# Patient Record
Sex: MALE | Race: WHITE
[De-identification: names, ages, dates, MRNs, and addresses within clinical notes are randomized per-mention and may not be internally consistent; named-entity substitution may affect disease eponyms.]

---

## 2017-03-24 ENCOUNTER — HOSPITAL ENCOUNTER (OUTPATIENT)
Age: 82
Discharge: HOME | End: 2017-03-24
Payer: MEDICARE

## 2017-03-24 DIAGNOSIS — J43.9: Primary | ICD-10-CM

## 2017-03-24 DIAGNOSIS — J98.4: ICD-10-CM

## 2017-08-31 ENCOUNTER — HOSPITAL ENCOUNTER (OUTPATIENT)
Dept: HOSPITAL 76 - SDS | Age: 82
Discharge: HOME | End: 2017-08-31
Attending: OPHTHALMOLOGY
Payer: MEDICARE

## 2017-08-31 VITALS — DIASTOLIC BLOOD PRESSURE: 73 MMHG | SYSTOLIC BLOOD PRESSURE: 162 MMHG

## 2017-08-31 DIAGNOSIS — J44.9: ICD-10-CM

## 2017-08-31 DIAGNOSIS — H25.12: Primary | ICD-10-CM

## 2017-08-31 PROCEDURE — 08RK3JZ REPLACEMENT OF LEFT LENS WITH SYNTHETIC SUBSTITUTE, PERCUTANEOUS APPROACH: ICD-10-PCS | Performed by: OPHTHALMOLOGY

## 2017-08-31 PROCEDURE — 66984 XCAPSL CTRC RMVL W/O ECP: CPT

## 2017-08-31 NOTE — OPERATIVE REPORT
DATE OF SURGERY:  08/31/2017 00:00:00

 

PREOPERATIVE DIAGNOSIS: Visually significant cataract, left eye. This is his 
first cataract surgery.

 

POSTOPERATIVE DIAGNOSIS: Visually significant cataract, left eye. This is his 
first cataract surgery.

 

NAME OF PROCEDURE: Phacoemulsification posterior chamber intraocular lens 
implant, left eye.

 

SURGEON: Sergio Bynum MD.

 

ANESTHESIA: Monitored anesthesia care.

 

COMPLICATIONS: None.

 

OPERATIVE INDICATIONS: The patient is an 85-year-old man with progressive 
vision loss in the left eye due to 2+ nuclear sclerotic cataract. Best 
corrected visual acuity was 20/50 with glare to 20/70 in the left eye. 
Indications for surgery were overall decrease in vision, difficulty seeing 
words on a computer screen, and difficulty driving at night because of head 
lights from other vehicles and/or streetlights. He was consented at length 
concerning the risks and benefits of cataract surgery, after which he expressed 
a desire to proceed with surgery.

 

OPERATIVE PROCEDURE: The patient was taken into OR #2 and placed under 
monitored anesthesia care. A surgical time-out was conducted confirming the 
correct patient, correct procedure, and correct surgical site. He was given 
topical anesthesia and then prepped and draped in the usual sterile fashion. 
The eye was entered at the 6- and 3 o'clock positions. Intracameral Shugarcaine 
was injected into the anterior chamber followed by Viscoat. A continuous tear 
curvilinear capsulorrhexis was performed. The nucleus was hydrodissected and 
phacoemulsified. The cortex was evacuated using automated infusion and 
aspiration (I/A). Provisc was injected in the capsular bag and a 25.0 diopter 
intraocular lens was inserted into the bag. Approximately 0.7 mL of a mixture 
of triamcinolone, moxifloxacin, and vancomycin was injected subconjunctivally 
in the superior quadrant for infection and inflammation prophylaxis. I/A was 
used to evacuate the viscoelastic materials. The eye was inflated to a 
physiologic pressure using balanced salt solution and found to be watertight. 
The patient was taken from the operating room in good condition and given 
postoperative instructions.

 

 

 

DD:08/31/2017 08:46:00  DT: 08/31/2017 09:32  JOB #: [id number]  EXT JOB #:[id number]

[facility] HospitalCHANDAN

## 2017-10-19 ENCOUNTER — HOSPITAL ENCOUNTER (OUTPATIENT)
Dept: HOSPITAL 76 - SDS | Age: 82
Discharge: HOME | End: 2017-10-19
Attending: OPHTHALMOLOGY
Payer: MEDICARE

## 2017-10-19 VITALS — DIASTOLIC BLOOD PRESSURE: 56 MMHG | SYSTOLIC BLOOD PRESSURE: 111 MMHG

## 2017-10-19 DIAGNOSIS — J44.9: ICD-10-CM

## 2017-10-19 DIAGNOSIS — H25.11: Primary | ICD-10-CM

## 2017-10-19 DIAGNOSIS — Z87.891: ICD-10-CM

## 2017-10-19 DIAGNOSIS — Z86.73: ICD-10-CM

## 2017-10-19 PROCEDURE — 66984 XCAPSL CTRC RMVL W/O ECP: CPT

## 2017-10-19 PROCEDURE — 08RJ3JZ REPLACEMENT OF RIGHT LENS WITH SYNTHETIC SUBSTITUTE, PERCUTANEOUS APPROACH: ICD-10-PCS | Performed by: OPHTHALMOLOGY

## 2017-10-19 NOTE — OPERATIVE REPORT
DATE OF SURGERY:  10/19/2017 00:00:00

 

PREOPERATIVE DIAGNOSIS: Visually significant cataract, right eye. Cataract surgery was performed on t
he left eye on 08/31/2017. 

 

POSTOPERATIVE DIAGNOSIS: Visually significant cataract, right eye. Cataract surgery was performed on 
the left eye on 08/31/2017. 

 

NAME OF PROCEDURE: Phacoemulsification posterior chamber intraocular lens implant, right eye.

 

SURGEON: Sergio Bynum MD.

 

ANESTHESIA: Monitored anesthesia care.

 

COMPLICATIONS: None.

 

OPERATIVE INDICATIONS: This is an 85-year-old man with progressive vision loss in the right eye due t
o 2+ nuclear sclerotic cataract. Best corrected visual acuity was 20/30 with glare to 20/40 in the ri
ght eye.

 

INDICATIONS FOR SURGERY: Overall decrease in vision, difficulty reading, difficulty driving in low li
ght or at night and difficulty driving at night because of headlights from other vehicles. He was con
sented at length concerning the risks and benefits of cataract surgery, after which he expressed a de
sire to proceed with surgery.

 

OPERATIVE PROCEDURE: The patient was taken to OR #2 and placed under monitored anesthesia care. A jenifer
gical time-out was conducted confirming the correct patient, correct procedure and correct surgical s
ite. He was given topical anesthesia and then prepped and draped in the usual sterile fashion. The ey
e was entered at the 12 and 9 o'clock positions. Intracameral Shugarcaine was injected into the anter
ior chamber followed by Viscoat. A continuous tear curvilinear capsulorrhexis was performed. Nucleus 
was hydrodissected and phacoemulsified. The cortex was evacuated using automated infusion aspiration.
 Provisc was injected into the capsular bag and a 24.5 diopter intraocular lens was inserted into the
 bag. Approximately 0.7 mL of a mixture of triamcinolone, moxifloxacin, and vancomycin was injected s
ubconjunctivally in the superior quadrant for infection and inflammation prophylaxis. I/A was used to
 evacuate the viscoelastic materials. Of note, the IOL kept wanting to decenter superiorly, and sever
al attempts to try to push it inferiorly were unsuccessful. So, I opted to sit the patient up immedia
tely after closing the eye to let gravity help me out with that, but there was enough optic in the ce
ntral axis to give him good vision, so I was not too concerned about it. The eye was inflated to phys
iologic pressure using balanced salt solution and found to be watertight. The patient was taken from 
the operating room in good condition and given postoperative instructions.

 

 

 

DD:10/19/2017 08:37:00  DT: 10/19/2017 09:15  JOB #: [id number]  EXT JOB #:[id number]

## 2017-11-13 ENCOUNTER — HOSPITAL ENCOUNTER (OUTPATIENT)
Dept: HOSPITAL 76 - DI | Age: 82
Discharge: HOME | End: 2017-11-13
Attending: PHYSICIAN ASSISTANT
Payer: MEDICARE

## 2017-11-13 DIAGNOSIS — J43.9: Primary | ICD-10-CM

## 2017-11-13 PROCEDURE — 71020: CPT

## 2017-11-13 NOTE — XRAY REPORT
TWO VIEW CHEST: 11/13/2017

 

CLINICAL INDICATION: Cough, COPD. 

 

FINDINGS:  Frontal and lateral views of the chest are compared to previous films of 03/24/2017. 

 

The cardiac silhouette is within normal limits. The lungs again demonstrate severe emphysema. Pleural
 calcifications are again noted. No new consolidation, effusion, or pneumothorax is present. 

 

IMPRESSION:  EMPHYSEMA. NO EVIDENCE OF ACUTE CARDIOPULMONARY DISEASE.

 

 

 

DD:11/13/2017 12:24:05  DT: 11/13/2017 12:39  JOB #: [id number]  EXT JOB #:[id number]

## 2018-01-15 ENCOUNTER — HOSPITAL ENCOUNTER (EMERGENCY)
Dept: HOSPITAL 76 - ED | Age: 83
Discharge: HOME | End: 2018-01-15
Payer: MEDICARE

## 2018-01-15 VITALS — DIASTOLIC BLOOD PRESSURE: 63 MMHG | SYSTOLIC BLOOD PRESSURE: 150 MMHG

## 2018-01-15 DIAGNOSIS — J11.1: Primary | ICD-10-CM

## 2018-01-15 DIAGNOSIS — J44.1: ICD-10-CM

## 2018-01-15 DIAGNOSIS — M19.90: ICD-10-CM

## 2018-01-15 DIAGNOSIS — N40.0: ICD-10-CM

## 2018-01-15 LAB
ANION GAP SERPL CALCULATED.4IONS-SCNC: 12 MMOL/L (ref 6–13)
BASOPHILS NFR BLD AUTO: 0.1 10^3/UL (ref 0–0.1)
BASOPHILS NFR BLD AUTO: 2.3 %
BUN SERPL-MCNC: 17 MG/DL (ref 6–20)
CALCIUM UR-MCNC: 8.9 MG/DL (ref 8.5–10.3)
CHLORIDE SERPL-SCNC: 95 MMOL/L (ref 101–111)
CO2 SERPL-SCNC: 26 MMOL/L (ref 21–32)
CREAT SERPLBLD-SCNC: 1 MG/DL (ref 0.6–1.2)
EOSINOPHIL # BLD AUTO: 0 10^3/UL (ref 0–0.7)
EOSINOPHIL NFR BLD AUTO: 0 %
ERYTHROCYTE [DISTWIDTH] IN BLOOD BY AUTOMATED COUNT: 13.6 % (ref 12–15)
GFRSERPLBLD MDRD-ARVRAT: 71 ML/MIN/{1.73_M2} (ref 89–?)
GLUCOSE SERPL-MCNC: 104 MG/DL (ref 70–100)
HGB UR QL STRIP: 13.6 G/DL (ref 14–18)
INFLUENZA A & B AG INTERPRET: (no result)
LYMPHOCYTES # SPEC AUTO: 0.6 10^3/UL (ref 1.5–3.5)
LYMPHOCYTES NFR BLD AUTO: 12.2 %
MCH RBC QN AUTO: 29.4 PG (ref 27–31)
MCHC RBC AUTO-ENTMCNC: 32.9 G/DL (ref 32–36)
MCV RBC AUTO: 89.3 FL (ref 80–94)
MONOCYTES # BLD AUTO: 1 10^3/UL (ref 0–1)
MONOCYTES NFR BLD AUTO: 19.9 %
NEUTROPHILS # BLD AUTO: 3.2 10^3/UL (ref 1.5–6.6)
NEUTROPHILS # SNV AUTO: 4.8 X10^3/UL (ref 4.8–10.8)
NEUTROPHILS NFR BLD AUTO: 65.6 %
PDW BLD AUTO: 8 FL (ref 7.4–11.4)
PLATELET # BLD: 136 10^3/UL (ref 130–450)
RBC MAR: 4.64 10^6/UL (ref 4.7–6.1)
SODIUM SERPLBLD-SCNC: 133 MMOL/L (ref 135–145)

## 2018-01-15 PROCEDURE — 80048 BASIC METABOLIC PNL TOTAL CA: CPT

## 2018-01-15 PROCEDURE — 36415 COLL VENOUS BLD VENIPUNCTURE: CPT

## 2018-01-15 PROCEDURE — 87276 INFLUENZA A AG IF: CPT

## 2018-01-15 PROCEDURE — 99284 EMERGENCY DEPT VISIT MOD MDM: CPT

## 2018-01-15 PROCEDURE — 93005 ELECTROCARDIOGRAM TRACING: CPT

## 2018-01-15 PROCEDURE — 87275 INFLUENZA B AG IF: CPT

## 2018-01-15 PROCEDURE — 85025 COMPLETE CBC W/AUTO DIFF WBC: CPT

## 2018-01-15 PROCEDURE — 71046 X-RAY EXAM CHEST 2 VIEWS: CPT

## 2018-01-15 PROCEDURE — 84484 ASSAY OF TROPONIN QUANT: CPT

## 2018-01-15 NOTE — ED PHYSICIAN DOCUMENTATION
History of Present Illness





- Stated complaint


Stated Complaint: CHEST PX/LETHARGIC





- Chief complaint


Chief Complaint: Cardiac





- Additonal information


Additional information: 





hx from pt


85m


cough cant clear mucous body aches fatigue and vomiting getting worse for a week


using mucnex pulmicort and albuterol s relief








Review of Systems


Constitutional: reports: Chills, Myalgias, Fatigue.  denies: Fever


Cardiac: denies: Chest pain / pressure


Respiratory: reports: Dyspnea, Cough


GI: reports: Nausea, Vomiting.  denies: Abdominal Pain


Musculoskeletal: denies: Extremity swelling


Neurologic: denies: Generalized weakness


Immunocompromised: denies: Immunocompromised





PD PAST MEDICAL HISTORY





- Past Medical History


Cardiovascular: None


Respiratory: Asthma, COPD, Other


Neuro: None


Endocrine/Autoimmune: None


GI: GI bleed, C.difficile


: Benign prostate hypertrophy


HEENT: Chronic vision loss


Psych: None


Musculoskeletal: Osteoarthritis, Other


Derm: None





- Past Surgical History


Past Surgical History: Yes


General: Colonoscopy, Other


Ortho: Shoulder arthroplasty, Other


HEENT: Tonsil/Adenoidectomy





- Present Medications


Home Medications: 


 Ambulatory Orders











 Medication  Instructions  Recorded  Confirmed


 


Ipratropium/Albuterol Inhaler 1 puffs INH QID 04/26/14 01/15/18





[Combivent Inhaler]   


 


guaiFENesin [Mucinex] 600 mg PO DAILY 08/04/14 01/15/18


 


Albuterol [Ventolin Hfa] 2 puffs INH Q4H 08/30/17 01/15/18


 


Oseltamivir [Tamiflu] 75 mg PO BID #9 capsule 01/15/18 


 


predniSONE [Deltasone] 60 mg PO DAILY 5 Days  tablet 01/15/18 














- Allergies


Allergies/Adverse Reactions: 


 Allergies











Allergy/AdvReac Type Severity Reaction Status Date / Time


 


No Known Drug Allergies Allergy   Verified 01/15/18 14:38














- Social History


Does the pt smoke?: No


Smoking Status: Never smoker


Does the pt drink ETOH?: No


Does the pt have substance abuse?: No





- Immunizations


Immunizations are current?: Yes





PD ED PE NORMAL





- Vitals


Vital signs reviewed: Yes





- General


General: Alert and oriented X 3





- HEENT


HEENT: PERRL





- Neck


Neck: Supple, no meningeal sign





- Cardiac


Cardiac: RRR





- Respiratory


Respiratory: Other (coarse bilaterally)





- Abdomen


Abdomen: Soft, Non tender





- Extremities


Extremities: No deformity, No edema, No calf tenderness / cord





- Neuro


Neuro: Alert and oriented X 3





Results





- Vitals


Vitals: 


 Vital Signs - 24 hr











  01/15/18 01/15/18





  14:34 15:00


 


Temperature 37.1 C 


 


Heart Rate 62 61


 


Respiratory 16 20





Rate  


 


Blood Pressure 178/71 H 146/58 H


 


O2 Saturation 100 99








 Oxygen











O2 Source                      Room air

















- EKG (time done)


  ** 1407


Rate: Rate (enter#) (61)


Rhythm: NSR


Axis: Normal


Ischemia: Normal ST segments





- Labs


Labs: 


 Laboratory Tests











  01/15/18 01/15/18 01/15/18





  14:40 15:16 15:16


 


WBC   4.8 


 


RBC   4.64 L 


 


Hgb   13.6 L 


 


Hct   41.4 L 


 


MCV   89.3 


 


MCH   29.4 


 


MCHC   32.9 


 


RDW   13.6 


 


Plt Count   136 


 


MPV   8.0 


 


Neut #   3.2 


 


Lymph #   0.6 L 


 


Mono #   1.0 


 


Eos #   0.0 


 


Baso #   0.1 


 


Absolute Nucleated RBC   0.00 


 


Nucleated RBC %   0.0 


 


Sodium    133 L


 


Potassium    4.0


 


Chloride    95 L


 


Carbon Dioxide    26


 


Anion Gap    12.0


 


BUN    17


 


Creatinine    1.0


 


Estimated GFR (MDRD)    71 L


 


Glucose    104 H


 


Calcium    8.9


 


Troponin I   


 


Influenza A (Rapid)  Negative  


 


Influenza B (Rapid)  Negative  


 


Influenza Types A,B Ag  -  














  01/15/18





  15:16


 


WBC 


 


RBC 


 


Hgb 


 


Hct 


 


MCV 


 


MCH 


 


MCHC 


 


RDW 


 


Plt Count 


 


MPV 


 


Neut # 


 


Lymph # 


 


Mono # 


 


Eos # 


 


Baso # 


 


Absolute Nucleated RBC 


 


Nucleated RBC % 


 


Sodium 


 


Potassium 


 


Chloride 


 


Carbon Dioxide 


 


Anion Gap 


 


BUN 


 


Creatinine 


 


Estimated GFR (MDRD) 


 


Glucose 


 


Calcium 


 


Troponin I  < 0.04


 


Influenza A (Rapid) 


 


Influenza B (Rapid) 


 


Influenza Types A,B Ag 














- Rads (name of study)


  ** CXR


Radiology: See rad report ([facility] - has some scarring or atelectasis RML but per 

rad no acute process)





PD MEDICAL DECISION MAKING





- ED course


ED course: 





no pna


flu swab neg but not sure of accuracy as hospital is apparently out of the 

viral swabs so this kits came with a plain cotton tip applicator instead of the 

usual viral swab and it was too stiff to reach deep into the nares - certainly 

his sx are c/w influenza and there is a major outbreak of both flu A and B in 

the community at this time and he has underlying lung dz so will tx clinically


with nl O2 sats and other VS will try outpt tx





spoke to pt and family at length, they are OK going home and understand reasons 

to return





Departure





- Departure


Disposition: 01 Home, Self Care


Clinical Impression: 


 Influenza, COPD exacerbation





Condition: Good


Instructions:  ED COPD Flare, ED Flu


Follow-Up: 


Jacky Haynes MD [Primary Care Provider] - 


Prescriptions: 


Oseltamivir [Tamiflu] 75 mg PO BID #9 capsule


predniSONE [Deltasone] 60 mg PO DAILY 5 Days  tablet

## 2018-01-15 NOTE — XRAY REPORT
EXAM:

CHEST RADIOGRAPHY

 

EXAM DATE: 1/15/2018 03:31 PM.

 

CLINICAL HISTORY: Cough, chest pain, and shortness of air.

 

COMPARISON: Chest 11/13/2017.

 

TECHNIQUE: 2 views.

 

FINDINGS: 

Hyperexpanded hyperlucent lungs again noted. Calcified pleural plaques on the left side again noted. 
Mild right base probable scarring, unchanged. Normal heart size. No acute cardiopulmonary disease see
n.

 

 

IMPRESSION: Emphysema. Left calcified pleural plaques. No acute findings seen.

 

[facility]

Referring Provider Line: [phone]

 

SITE ID: [id number]

## 2018-01-15 NOTE — XRAY PRELIMINARY REPORT
Accession: [id number]

Exam: XR CHEST 2 VIEW X-RAY

 

IMPRESSION: Emphysema. Left calcified pleural plaques. No acute findings seen.

 

[facility]

 

SITE ID: [id number]

## 2018-01-21 ENCOUNTER — HOSPITAL ENCOUNTER (EMERGENCY)
Dept: HOSPITAL 76 - ED | Age: 83
Discharge: HOME | End: 2018-01-21
Payer: MEDICARE

## 2018-01-21 VITALS — DIASTOLIC BLOOD PRESSURE: 72 MMHG | SYSTOLIC BLOOD PRESSURE: 164 MMHG

## 2018-01-21 DIAGNOSIS — Z79.52: ICD-10-CM

## 2018-01-21 DIAGNOSIS — Z79.51: ICD-10-CM

## 2018-01-21 DIAGNOSIS — J18.9: Primary | ICD-10-CM

## 2018-01-21 DIAGNOSIS — J44.9: ICD-10-CM

## 2018-01-21 LAB
ALBUMIN DIAFP-MCNC: 3.7 G/DL (ref 3.2–5.5)
ALBUMIN/GLOB SERPL: 1.1 {RATIO} (ref 1–2.2)
ALP SERPL-CCNC: 51 IU/L (ref 42–121)
ALT SERPL W P-5'-P-CCNC: 15 IU/L (ref 10–60)
ANION GAP SERPL CALCULATED.4IONS-SCNC: 12 MMOL/L (ref 6–13)
AST SERPL W P-5'-P-CCNC: 15 IU/L (ref 10–42)
BASOPHILS NFR BLD AUTO: 0 10^3/UL (ref 0–0.1)
BASOPHILS NFR BLD AUTO: 0.1 %
BILIRUB BLD-MCNC: 1.4 MG/DL (ref 0.2–1)
BUN SERPL-MCNC: 19 MG/DL (ref 6–20)
CALCIUM UR-MCNC: 9.4 MG/DL (ref 8.5–10.3)
CHLORIDE SERPL-SCNC: 93 MMOL/L (ref 101–111)
CO2 SERPL-SCNC: 26 MMOL/L (ref 21–32)
CREAT SERPLBLD-SCNC: 1 MG/DL (ref 0.6–1.2)
EOSINOPHIL # BLD AUTO: 0 10^3/UL (ref 0–0.7)
EOSINOPHIL NFR BLD AUTO: 0 %
ERYTHROCYTE [DISTWIDTH] IN BLOOD BY AUTOMATED COUNT: 13.7 % (ref 12–15)
GFRSERPLBLD MDRD-ARVRAT: 71 ML/MIN/{1.73_M2} (ref 89–?)
GLOBULIN SER-MCNC: 3.4 G/DL (ref 2.1–4.2)
GLUCOSE SERPL-MCNC: 131 MG/DL (ref 70–100)
HGB UR QL STRIP: 14.2 G/DL (ref 14–18)
INFLUENZA A & B AG INTERPRET: (no result)
LIPASE SERPL-CCNC: 17 U/L (ref 22–51)
LYMPHOCYTES # SPEC AUTO: 0.8 10^3/UL (ref 1.5–3.5)
LYMPHOCYTES NFR BLD AUTO: 6.8 %
MCH RBC QN AUTO: 29.8 PG (ref 27–31)
MCHC RBC AUTO-ENTMCNC: 33.5 G/DL (ref 32–36)
MCV RBC AUTO: 89 FL (ref 80–94)
MONOCYTES # BLD AUTO: 1.3 10^3/UL (ref 0–1)
MONOCYTES NFR BLD AUTO: 11 %
NEUTROPHILS # BLD AUTO: 9.7 10^3/UL (ref 1.5–6.6)
NEUTROPHILS # SNV AUTO: 11.8 X10^3/UL (ref 4.8–10.8)
NEUTROPHILS NFR BLD AUTO: 82.1 %
PDW BLD AUTO: 8.7 FL (ref 7.4–11.4)
PLATELET # BLD: 141 10^3/UL (ref 130–450)
PROT SPEC-MCNC: 7.1 G/DL (ref 6.7–8.2)
RBC MAR: 4.76 10^6/UL (ref 4.7–6.1)
SODIUM SERPLBLD-SCNC: 131 MMOL/L (ref 135–145)

## 2018-01-21 PROCEDURE — 83690 ASSAY OF LIPASE: CPT

## 2018-01-21 PROCEDURE — 87276 INFLUENZA A AG IF: CPT

## 2018-01-21 PROCEDURE — 71045 X-RAY EXAM CHEST 1 VIEW: CPT

## 2018-01-21 PROCEDURE — 83880 ASSAY OF NATRIURETIC PEPTIDE: CPT

## 2018-01-21 PROCEDURE — 85025 COMPLETE CBC W/AUTO DIFF WBC: CPT

## 2018-01-21 PROCEDURE — 99283 EMERGENCY DEPT VISIT LOW MDM: CPT

## 2018-01-21 PROCEDURE — 93005 ELECTROCARDIOGRAM TRACING: CPT

## 2018-01-21 PROCEDURE — 94640 AIRWAY INHALATION TREATMENT: CPT

## 2018-01-21 PROCEDURE — 36415 COLL VENOUS BLD VENIPUNCTURE: CPT

## 2018-01-21 PROCEDURE — 80053 COMPREHEN METABOLIC PANEL: CPT

## 2018-01-21 PROCEDURE — 99284 EMERGENCY DEPT VISIT MOD MDM: CPT

## 2018-01-21 PROCEDURE — 87275 INFLUENZA B AG IF: CPT

## 2018-01-21 PROCEDURE — 84484 ASSAY OF TROPONIN QUANT: CPT

## 2018-01-21 NOTE — XRAY REPORT
EXAM: 

CHEST RADIOGRAPHY

 

EXAM DATE: 1/21/2018 01:02 PM.

 

CLINICAL HISTORY: Productive cough.

 

COMPARISON: 2 view chest 01/15/2018.

 

TECHNIQUE: 1 view.

 

FINDINGS:

 

There are large lung volumes and flat hemidiaphragms.

 

Calcified left pleural plaques are noted.

 

Stable basilar scarring.

 

Mild left mid and lower lung patchy opacity is more conspicuous on today's exam.

 

No pneumothorax or obvious pleural effusion. Mediastinum stable.

 

IMPRESSION: Mild left mid and lower lung patchy opacity is more conspicuous today. Correlate clinical
ly for pneumonia.

 

[facility]

Referring Provider Line: [phone]

 

SITE ID: [id number]

## 2018-01-21 NOTE — ED PHYSICIAN DOCUMENTATION
PD HPI URI





- Stated complaint


Stated Complaint: MALAISE





- Chief complaint


Chief Complaint: General





- History obtained from


History obtained from: Patient





- History of Present Illness


Timing - onset: How many weeks ago (1)


Timing duration: Weeks (1)


Timing details: Abrupt onset (he started with cough and flu symptoms a week ago 

and has persisted. Seen in ED and Rx Albuterol, and steroids. Still feeling 

"like hell" and worse cough.), Still present


Associated symptoms: Fever, Dry cough.  No: Sore throat, Chest pain, NVD


Contributing factors: COPD / asthma.  No: Sick contact, Travel


Similar symptoms before: Has not had sx before


Recently seen: Emergency Dept





Review of Systems


Constitutional: reports: Fever, Chills, Myalgias


Nose: denies: Rhinorrhea / runny nose, Congestion


Throat: denies: Sore throat


Cardiac: denies: Chest pain / pressure, Palpitations


Respiratory: reports: Cough, Wheezing.  denies: Dyspnea


GI: denies: Nausea, Vomiting, Diarrhea


Skin: denies: Rash, Lesions


Neurologic: reports: Generalized weakness.  denies: Focal weakness, Numbness, 

Near syncope





PD PAST MEDICAL HISTORY





- Past Medical History


Cardiovascular: None


Respiratory: Asthma, COPD, Other


Neuro: None


Endocrine/Autoimmune: None


GI: GI bleed, C.difficile


: Benign prostate hypertrophy


HEENT: Chronic vision loss


Psych: None


Musculoskeletal: Osteoarthritis, Other


Derm: None





- Past Surgical History


Past Surgical History: Yes


General: Colonoscopy, Other


Ortho: Shoulder arthroplasty, Other


HEENT: Tonsil/Adenoidectomy





- Present Medications


Home Medications: 


 Ambulatory Orders











 Medication  Instructions  Recorded  Confirmed


 


Ipratropium/Albuterol Inhaler 1 puffs INH QID 04/26/14 01/15/18





[Combivent Inhaler]   


 


guaiFENesin [Mucinex] 600 mg PO DAILY 08/04/14 01/15/18


 


Albuterol [Ventolin Hfa] 2 puffs INH Q4H 08/30/17 01/15/18


 


Oseltamivir [Tamiflu] 75 mg PO BID #9 capsule 01/15/18 


 


predniSONE [Deltasone] 60 mg PO DAILY 5 Days  tablet 01/15/18 


 


Dexamethasone [Decadron] 4 mg PO DAILY #5 tablet 01/21/18 


 


Doxycycline Monohydrate 100 mg PO BID #14 tablet 01/21/18 


 


Loperamide [Imodium] 2 mg PO BID #14 capsule 01/21/18 


 


Saccharomyces Boulardii [Florastor] 500 mg PO BID #28 capsule 01/21/18 














- Allergies


Allergies/Adverse Reactions: 


 Allergies











Allergy/AdvReac Type Severity Reaction Status Date / Time


 


No Known Drug Allergies Allergy   Verified 01/15/18 14:38














- Social History


Does the pt smoke?: No


Smoking Status: Never smoker


Does the pt drink ETOH?: No


Does the pt have substance abuse?: No





- Immunizations


Immunizations are current?: Yes





PD ED PE NORMAL





- Vitals


Vital signs reviewed: Yes





- General


General: Alert and oriented X 3, No acute distress, Well developed/nourished





- HEENT


HEENT: Pharynx benign





- Neck


Neck: Supple, no meningeal sign, No adenopathy





- Cardiac


Cardiac: RRR, No murmur





- Respiratory


Respiratory: No: Clear bilaterally (some mild wheezing; no coarse sounds. )





- Abdomen


Abdomen: Soft, Non tender





- Back


Back: No CVA TTP





- Derm


Derm: Normal color, Warm and dry





- Extremities


Extremities: No tenderness to palpate, Normal ROM s pain, No edema, No calf 

tenderness / cord





- Neuro


Neuro: Alert and oriented X 3, No motor deficit, Normal speech





- Psych


Psych: Normal mood, Normal affect





Results





- Vitals


Vitals: 


 Vital Signs - 24 hr











  01/21/18 01/21/18 01/21/18





  12:23 13:10 14:11


 


Temperature 36.4 C L  


 


Heart Rate 110 H 68 


 


Respiratory 16 8 L 





Rate   


 


Blood Pressure 136/55 H  


 


O2 Saturation   99














  01/21/18





  16:35


 


Temperature 


 


Heart Rate 75


 


Respiratory 22





Rate 


 


Blood Pressure 164/72 H


 


O2 Saturation 100








 Oxygen











O2 Source                      Room air

















- Labs


Labs: 


 Laboratory Tests











  01/21/18 01/21/18 01/21/18





  12:30 12:45 12:45


 


WBC   11.8 H 


 


RBC   4.76 


 


Hgb   14.2 


 


Hct   42.3 


 


MCV   89.0 


 


MCH   29.8 


 


MCHC   33.5 


 


RDW   13.7 


 


Plt Count   141 


 


MPV   8.7 


 


Neut #   9.7 H 


 


Lymph #   0.8 L 


 


Mono #   1.3 H 


 


Eos #   0.0 


 


Baso #   0.0 


 


Absolute Nucleated RBC   0.00 


 


Nucleated RBC %   0.0 


 


Sodium    131 L


 


Potassium    3.9


 


Chloride    93 L


 


Carbon Dioxide    26


 


Anion Gap    12.0


 


BUN    19


 


Creatinine    1.0


 


Estimated GFR (MDRD)    71 L


 


Glucose    131 H


 


Calcium    9.4


 


Total Bilirubin    1.4 H


 


AST    15


 


ALT    15


 


Alkaline Phosphatase    51


 


Troponin I   


 


B-Natriuretic Peptide   


 


Total Protein    7.1


 


Albumin    3.7


 


Globulin    3.4


 


Albumin/Globulin Ratio    1.1


 


Lipase    17 L


 


Influenza A (Rapid)  Negative  


 


Influenza B (Rapid)  Negative  


 


Influenza Types A,B Ag  -  














  01/21/18 01/21/18





  12:45 12:45


 


WBC  


 


RBC  


 


Hgb  


 


Hct  


 


MCV  


 


MCH  


 


MCHC  


 


RDW  


 


Plt Count  


 


MPV  


 


Neut #  


 


Lymph #  


 


Mono #  


 


Eos #  


 


Baso #  


 


Absolute Nucleated RBC  


 


Nucleated RBC %  


 


Sodium  


 


Potassium  


 


Chloride  


 


Carbon Dioxide  


 


Anion Gap  


 


BUN  


 


Creatinine  


 


Estimated GFR (MDRD)  


 


Glucose  


 


Calcium  


 


Total Bilirubin  


 


AST  


 


ALT  


 


Alkaline Phosphatase  


 


Troponin I  < 0.04 


 


B-Natriuretic Peptide   40


 


Total Protein  


 


Albumin  


 


Globulin  


 


Albumin/Globulin Ratio  


 


Lipase  


 


Influenza A (Rapid)  


 


Influenza B (Rapid)  


 


Influenza Types A,B Ag  














- Rads (name of study)


  ** chest


Radiology: Prelim report reviewed (left lung focal infiltrates c/w pneumonia), 

EMP read contemporaneously





PD MEDICAL DECISION MAKING





- ED course


Complexity details: reviewed results (focal infiltrates (small) left lung 

compared to prior. Dx pneumonia. prior scarring left lung similar to prior. ), 

considered differential, d/w patient





Departure





- Departure


Disposition: 01 Home, Self Care


Clinical Impression: 


 Flu-like symptoms





Pneumonia


Qualifiers:


 Pneumonia type: due to unspecified organism Laterality: left Lung location: 

unspecified part of lung Qualified Code(s): J18.9 - Pneumonia, unspecified 

organism





Condition: Stable


Record reviewed to determine appropriate education?: Yes


Instructions:  ED Pneumonia Adult


Follow-Up: 


Jacky Haynes MD [Primary Care Provider] - 


Prescriptions: 


Dexamethasone [Decadron] 4 mg PO DAILY #5 tablet


Doxycycline Monohydrate 100 mg PO BID #14 tablet


Loperamide [Imodium] 2 mg PO BID #14 capsule


Saccharomyces Boulardii [Florastor] 500 mg PO BID #28 capsule


Comments: 


Your x-ray does appear that you are developing some pneumonia.  Otherwise your 

blood count is still good.  This sounds generally like a flu type illness with 

now some pneumonia as well.  We will add doxycycline twice daily for a week.  

Along with that to use Imodium twice daily and a probiotic Florastor twice 

daily as well.  This would to minimize the diarrhea/GI side effects of the 

antibiotic.  Continue the prednisone another 5 days.  Continue your albuterol 

inhaler 2 puffs 4 times a day.  Follow-up with your primary care in the next 2-

3 days.  Return sooner if worse.


Discharge Date/Time: 01/21/18 17:00

## 2018-02-09 ENCOUNTER — HOSPITAL ENCOUNTER (OUTPATIENT)
Dept: HOSPITAL 76 - DI | Age: 83
Discharge: HOME | End: 2018-02-09
Attending: INTERNAL MEDICINE
Payer: MEDICARE

## 2018-02-09 DIAGNOSIS — J18.9: Primary | ICD-10-CM

## 2018-02-09 DIAGNOSIS — J43.9: ICD-10-CM

## 2018-02-09 PROCEDURE — 71046 X-RAY EXAM CHEST 2 VIEWS: CPT

## 2018-02-09 NOTE — XRAY REPORT
TWO VIEW CHEST:  02/09/2018

 

CLINICAL INDICATION:  Followup pneumonia, COPD.

 

COMPARISON:  01/21/2018.

 

FINDINGS:  Frontal and lateral views of the chest again demonstrate extensive emphysema. Left 

greater than right calcified pleural plaquing is again noted.  There is new parenchymal 

consolidation at the left base.  No effusion or pneumothorax is present.

 

IMPRESSION:  NEW LEFT BASILAR INFILTRATE.  STABLE EMPHYSEMA AND CALCIFIED PLEURAL PLAQUING.

 

 

DD: 02/09/2018 15:38

TD: 02/09/2018 17:18

Job #: [id number]

## 2018-03-15 ENCOUNTER — HOSPITAL ENCOUNTER (OUTPATIENT)
Dept: HOSPITAL 76 - DI | Age: 83
Discharge: HOME | End: 2018-03-15
Attending: INTERNAL MEDICINE
Payer: MEDICARE

## 2018-03-15 DIAGNOSIS — J92.9: Primary | ICD-10-CM

## 2018-03-15 PROCEDURE — 71046 X-RAY EXAM CHEST 2 VIEWS: CPT

## 2018-03-16 NOTE — XRAY REPORT
TWO VIEW CHEST:  03/15/2018.

 

COMPARISON:  Two view chest 02/09/2018 and chest CT 10/07/2014.

 

INDICATION Followup for pneumonia.

 

TECHNIQUE:  Two views.

 

FINDINGS:  There are calcified pleural plaques.  There are large lung volumes 
with flattening 

of the diaphragms.

 

No pneumothorax or pleural effusion.  Mediastinum otherwise unremarkable.

 

IMPRESSION:  STABLE APPEARING PLEURAL PLAQUES.  NO EVIDENCE OF PNEUMONIA.  
PREVIOUSLY DESCRIBED

LEFT BASILAR CONSOLIDATION IS FAVORED TO REPRESENT AN ADDITIONAL PLEURAL PLAQUE.

 

 

DD: 03/16/2018 09:14

TD: 03/16/2018 09:33

Job #: [id number]

MTDD

## 2018-08-07 ENCOUNTER — HOSPITAL ENCOUNTER (EMERGENCY)
Dept: HOSPITAL 76 - ED | Age: 83
Discharge: HOME | End: 2018-08-07
Payer: MEDICARE

## 2018-08-07 VITALS — SYSTOLIC BLOOD PRESSURE: 172 MMHG | DIASTOLIC BLOOD PRESSURE: 62 MMHG

## 2018-08-07 DIAGNOSIS — N39.0: Primary | ICD-10-CM

## 2018-08-07 LAB
ALBUMIN DIAFP-MCNC: 3.5 G/DL (ref 3.2–5.5)
ALBUMIN/GLOB SERPL: 1 {RATIO} (ref 1–2.2)
ALP SERPL-CCNC: 50 IU/L (ref 42–121)
ALT SERPL W P-5'-P-CCNC: 12 IU/L (ref 10–60)
ANION GAP SERPL CALCULATED.4IONS-SCNC: 8 MMOL/L (ref 6–13)
AST SERPL W P-5'-P-CCNC: 18 IU/L (ref 10–42)
BASOPHILS NFR BLD AUTO: 0 10^3/UL (ref 0–0.1)
BASOPHILS NFR BLD AUTO: 0.4 %
BILIRUB BLD-MCNC: 0.9 MG/DL (ref 0.2–1)
BUN SERPL-MCNC: 19 MG/DL (ref 6–20)
CALCIUM UR-MCNC: 9 MG/DL (ref 8.5–10.3)
CHLORIDE SERPL-SCNC: 100 MMOL/L (ref 101–111)
CLARITY UR REFRACT.AUTO: CLEAR
CO2 SERPL-SCNC: 26 MMOL/L (ref 21–32)
CREAT SERPLBLD-SCNC: 0.9 MG/DL (ref 0.6–1.2)
EOSINOPHIL # BLD AUTO: 0.1 10^3/UL (ref 0–0.7)
EOSINOPHIL NFR BLD AUTO: 0.6 %
ERYTHROCYTE [DISTWIDTH] IN BLOOD BY AUTOMATED COUNT: 13.5 % (ref 12–15)
GFRSERPLBLD MDRD-ARVRAT: 80 ML/MIN/{1.73_M2} (ref 89–?)
GLOBULIN SER-MCNC: 3.5 G/DL (ref 2.1–4.2)
GLUCOSE SERPL-MCNC: 128 MG/DL (ref 70–100)
GLUCOSE UR QL STRIP.AUTO: NEGATIVE MG/DL
HGB UR QL STRIP: 12.9 G/DL (ref 14–18)
KETONES UR QL STRIP.AUTO: NEGATIVE MG/DL
LIPASE SERPL-CCNC: 22 U/L (ref 22–51)
LYMPHOCYTES # SPEC AUTO: 1.4 10^3/UL (ref 1.5–3.5)
LYMPHOCYTES NFR BLD AUTO: 17.3 %
MCH RBC QN AUTO: 29.5 PG (ref 27–31)
MCHC RBC AUTO-ENTMCNC: 33.2 G/DL (ref 32–36)
MCV RBC AUTO: 88.7 FL (ref 80–94)
MONOCYTES # BLD AUTO: 1.1 10^3/UL (ref 0–1)
MONOCYTES NFR BLD AUTO: 12.8 %
NEUTROPHILS # BLD AUTO: 5.7 10^3/UL (ref 1.5–6.6)
NEUTROPHILS # SNV AUTO: 8.2 X10^3/UL (ref 4.8–10.8)
NEUTROPHILS NFR BLD AUTO: 68.9 %
NITRITE UR QL STRIP.AUTO: NEGATIVE
PDW BLD AUTO: 8.6 FL (ref 7.4–11.4)
PH UR STRIP.AUTO: 6 PH (ref 5–7.5)
PLATELET # BLD: 155 10^3/UL (ref 130–450)
PROT SPEC-MCNC: 7 G/DL (ref 6.7–8.2)
PROT UR STRIP.AUTO-MCNC: NEGATIVE MG/DL
RBC # UR STRIP.AUTO: (no result) /UL
RBC # URNS HPF: (no result) /HPF (ref 0–5)
RBC MAR: 4.39 10^6/UL (ref 4.7–6.1)
SODIUM SERPLBLD-SCNC: 134 MMOL/L (ref 135–145)
SP GR UR STRIP.AUTO: 1.01 (ref 1–1.03)
SQUAMOUS URNS QL MICRO: (no result)
UROBILINOGEN UR QL STRIP.AUTO: (no result) E.U./DL
UROBILINOGEN UR STRIP.AUTO-MCNC: NEGATIVE MG/DL

## 2018-08-07 PROCEDURE — 93005 ELECTROCARDIOGRAM TRACING: CPT

## 2018-08-07 PROCEDURE — 80053 COMPREHEN METABOLIC PANEL: CPT

## 2018-08-07 PROCEDURE — 36415 COLL VENOUS BLD VENIPUNCTURE: CPT

## 2018-08-07 PROCEDURE — 87086 URINE CULTURE/COLONY COUNT: CPT

## 2018-08-07 PROCEDURE — 85025 COMPLETE CBC W/AUTO DIFF WBC: CPT

## 2018-08-07 PROCEDURE — 81001 URINALYSIS AUTO W/SCOPE: CPT

## 2018-08-07 PROCEDURE — 99284 EMERGENCY DEPT VISIT MOD MDM: CPT

## 2018-08-07 PROCEDURE — 83690 ASSAY OF LIPASE: CPT

## 2018-08-07 PROCEDURE — 81003 URINALYSIS AUTO W/O SCOPE: CPT

## 2018-08-07 PROCEDURE — 99283 EMERGENCY DEPT VISIT LOW MDM: CPT

## 2018-08-07 NOTE — ED PHYSICIAN DOCUMENTATION
History of Present Illness





- Stated complaint


Stated Complaint: CONFUSED/TIRED





- Chief complaint


Chief Complaint: Neuro





- History obtained from


History obtained from: Patient





- History of Present Illness


Timing: Yesterday


Pain level max: 0


Pain level now: 0


Improved by: rest





- Additonal information


Additional information: 





c/o gen. fatigue, sleeping more than usual (most of the day today). 





Review of Systems


Constitutional: reports: Fatigue.  denies: Fever, Chills, Myalgias, Sweats


Cardiac: reports: Reviewed and negative


Respiratory: reports: Reviewed and negative


GI: reports: Reviewed and negative


: reports: Frequency.  denies: Dysuria





PD PAST MEDICAL HISTORY





- Past Medical History


Cardiovascular: None


Respiratory: Asthma, COPD, Other


Endocrine/Autoimmune: None


GI: GI bleed, C.difficile


: Benign prostate hypertrophy


HEENT: Chronic vision loss


Psych: None


Musculoskeletal: Osteoarthritis, Other


Derm: None





- Past Surgical History


Past Surgical History: Yes


General: Colonoscopy, Other


Ortho: Shoulder arthroplasty, Other


HEENT: Tonsil/Adenoidectomy





- Present Medications


Home Medications: 


 Ambulatory Orders











 Medication  Instructions  Recorded  Confirmed


 


Ipratropium/Albuterol Inhaler 1 puffs INH QID 04/26/14 01/15/18





[Combivent Inhaler]   


 


guaiFENesin [Mucinex] 600 mg PO DAILY 08/04/14 01/15/18


 


Albuterol [Ventolin Hfa] 2 puffs INH Q4H 08/30/17 01/15/18


 


Oseltamivir [Tamiflu] 75 mg PO BID #9 capsule 01/15/18 


 


predniSONE [Deltasone] 60 mg PO DAILY 5 Days  tablet 01/15/18 


 


Dexamethasone [Decadron] 4 mg PO DAILY #5 tablet 01/21/18 


 


Doxycycline Monohydrate 100 mg PO BID #14 tablet 01/21/18 


 


Loperamide [Imodium] 2 mg PO BID #14 capsule 01/21/18 


 


Saccharomyces Boulardii [Florastor] 500 mg PO BID #28 capsule 01/21/18 


 


Amox/Clav 875/125 [Augmentin] 1 each PO Q12H #19 tablet 08/07/18 


 


Saccharomyces Boulardii [Florastor] 500 mg PO BID #20 capsule 08/07/18 














- Allergies


Allergies/Adverse Reactions: 


 Allergies











Allergy/AdvReac Type Severity Reaction Status Date / Time


 


No Known Drug Allergies Allergy   Verified 08/07/18 17:03














- Social History


Does the pt smoke?: No


Smoking Status: Never smoker


Does the pt drink ETOH?: No


Does the pt have substance abuse?: No





- Immunizations


Immunizations are current?: Yes





PD ED PE NORMAL





- Vitals


Vital signs reviewed: Yes





- General


General: Alert and oriented X 3, No acute distress, Well developed/nourished





- HEENT


HEENT: PERRL, EOMI, Moist mucous membranes





- Cardiac


Cardiac: RRR, No murmur





- Respiratory


Respiratory: No respiratory distress, Clear bilaterally





- Abdomen


Abdomen: Soft, Non tender





- Back


Back: No CVA TTP





- Derm


Derm: Normal color, Warm and dry, No rash





- Extremities


Extremities: No edema





- Neuro


Neuro: Alert and oriented X 3





Results





- Vitals


Vitals: 


 Oxygen











O2 Source                      Room air

















- Labs


Labs: 


 Microbiology











 08/07/18 19:10 Urine Culture - Preliminary





 Urine,Random    CULTURE IN PROGRESS. RESULTS TO FOLLOW.








 Laboratory Tests











  08/07/18 08/07/18 08/07/18





  17:11 17:11 19:10


 


WBC  8.2  


 


RBC  4.39 L  


 


Hgb  12.9 L  


 


Hct  39.0 L  


 


MCV  88.7  


 


MCH  29.5  


 


MCHC  33.2  


 


RDW  13.5  


 


Plt Count  155  


 


MPV  8.6  


 


Neut # (Auto)  5.7  


 


Lymph # (Auto)  1.4 L  


 


Mono # (Auto)  1.1 H  


 


Eos # (Auto)  0.1  


 


Baso # (Auto)  0.0  


 


Absolute Nucleated RBC  0.00  


 


Nucleated RBC %  0.0  


 


Sodium   134 L 


 


Potassium   4.0 


 


Chloride   100 L 


 


Carbon Dioxide   26 


 


Anion Gap   8.0 


 


BUN   19 


 


Creatinine   0.9 


 


Estimated GFR (MDRD)   80 L 


 


Glucose   128 H 


 


Calcium   9.0 


 


Total Bilirubin   0.9 


 


AST   18 


 


ALT   12 


 


Alkaline Phosphatase   50 


 


Total Protein   7.0 


 


Albumin   3.5 


 


Globulin   3.5 


 


Albumin/Globulin Ratio   1.0 


 


Lipase   22 


 


Urine Color    YELLOW


 


Urine Clarity    CLEAR


 


Urine pH    6.0


 


Ur Specific Gravity    1.010


 


Urine Protein    NEGATIVE


 


Urine Glucose (UA)    NEGATIVE


 


Urine Ketones    NEGATIVE


 


Urine Occult Blood    TRACE-INTA


 


Urine Nitrite    NEGATIVE


 


Urine Bilirubin    NEGATIVE


 


Urine Urobilinogen    1 (NORMAL)


 


Ur Leukocyte Esterase    LARGE H


 


Urine RBC    0-5


 


Urine WBC    >25 H


 


Ur Squamous Epith Cells    FEW Squamous


 


Urine Bacteria    Few


 


Ur Microscopic Review    INDICATED


 


Urine Culture Comments    INDICATED














PD MEDICAL DECISION MAKING





- ED course


Complexity details: reviewed results, re-evaluated patient, considered 

differential, d/w patient





- Sepsis Event


Vital Signs: 


 Oxygen











O2 Source                      Room air

















Departure





- Departure


Disposition: 01 Home, Self Care


Clinical Impression: 


 UTI (urinary tract infection)





Condition: Good


Instructions:  ED UTI Cystitis Male


Follow-Up: 


Jacky Haynes MD [Primary Care Provider] - 


Prescriptions: 


Amox/Clav 875/125 [Augmentin] 1 each PO Q12H #19 tablet


Saccharomyces Boulardii [Florastor] 500 mg PO BID #20 capsule


Comments: 


Your tests tonight suggest that you have a urinary tract infection. This 

requires an antibiotic. I understand your concern regarding c. diff, but, 

unfortunately, all antibiotics have a potential to cause c. diff, and the ones 

that are less likely to cause c. diff tend to be less effective for urinary 

tract infections. You can contact your primary care provider in the morning to 

ask if they recommend a change in the antibiotic I have prescribed, but I 

strongly recommend that you take the antibiotic to prevent the urinary tract 

infection from getting worse and possibly getting into your blood and causing a 

serious infection.


Discharge Date/Time: 08/07/18 21:18